# Patient Record
Sex: MALE | Race: WHITE | Employment: FULL TIME | URBAN - METROPOLITAN AREA
[De-identification: names, ages, dates, MRNs, and addresses within clinical notes are randomized per-mention and may not be internally consistent; named-entity substitution may affect disease eponyms.]

---

## 2019-10-23 ENCOUNTER — OFFICE VISIT (OUTPATIENT)
Dept: FAMILY MEDICINE | Facility: CLINIC | Age: 29
End: 2019-10-23
Payer: COMMERCIAL

## 2019-10-23 VITALS
WEIGHT: 213 LBS | BODY MASS INDEX: 29.82 KG/M2 | DIASTOLIC BLOOD PRESSURE: 78 MMHG | RESPIRATION RATE: 18 BRPM | TEMPERATURE: 97.9 F | OXYGEN SATURATION: 99 % | HEIGHT: 71 IN | SYSTOLIC BLOOD PRESSURE: 126 MMHG | HEART RATE: 91 BPM

## 2019-10-23 DIAGNOSIS — R30.9 PAINFUL URINATION: Primary | ICD-10-CM

## 2019-10-23 LAB
ALBUMIN UR-MCNC: NEGATIVE MG/DL
APPEARANCE UR: CLEAR
BILIRUB UR QL STRIP: NEGATIVE
COLOR UR AUTO: YELLOW
GLUCOSE UR STRIP-MCNC: NEGATIVE MG/DL
HGB UR QL STRIP: NEGATIVE
KETONES UR STRIP-MCNC: NEGATIVE MG/DL
LEUKOCYTE ESTERASE UR QL STRIP: NEGATIVE
NITRATE UR QL: NEGATIVE
PH UR STRIP: 7 PH (ref 5–7)
SOURCE: NORMAL
SP GR UR STRIP: 1.01 (ref 1–1.03)
UROBILINOGEN UR STRIP-ACNC: 0.2 EU/DL (ref 0.2–1)

## 2019-10-23 PROCEDURE — 99203 OFFICE O/P NEW LOW 30 MIN: CPT | Performed by: FAMILY MEDICINE

## 2019-10-23 PROCEDURE — 87591 N.GONORRHOEAE DNA AMP PROB: CPT | Performed by: FAMILY MEDICINE

## 2019-10-23 PROCEDURE — 87491 CHLMYD TRACH DNA AMP PROBE: CPT | Performed by: FAMILY MEDICINE

## 2019-10-23 PROCEDURE — 81003 URINALYSIS AUTO W/O SCOPE: CPT | Performed by: FAMILY MEDICINE

## 2019-10-23 ASSESSMENT — PAIN SCALES - GENERAL: PAINLEVEL: NO PAIN (0)

## 2019-10-23 ASSESSMENT — MIFFLIN-ST. JEOR: SCORE: 1954.32

## 2019-10-23 NOTE — NURSING NOTE
"Chief Complaint   Patient presents with     Urinary Pain     Painful urination with odor and dark in color. Intermittent over the last 6 months.      Flu Shot     declined       Initial /78   Pulse 91   Temp 97.9  F (36.6  C) (Tympanic)   Resp 18   Ht 1.797 m (5' 10.75\")   Wt 96.6 kg (213 lb)   SpO2 99%   BMI 29.92 kg/m   Estimated body mass index is 29.92 kg/m  as calculated from the following:    Height as of this encounter: 1.797 m (5' 10.75\").    Weight as of this encounter: 96.6 kg (213 lb).    Medication Reconciliation: complete    Rosalie Aldana CMA    "

## 2019-10-23 NOTE — PATIENT INSTRUCTIONS
Patient Education            Patient Education     Dysuria with Uncertain Cause (Adult)    The urethra is the tube that allows urine to pass out of the body. In a woman, the urethra is the opening above the vagina. In men, the urethra is the opening on the tip of the penis. Dysuria is the feeling of pain or burning in the urethra when passing urine.  Dysuria can be caused by anything that irritates or inflames the urethra. An infection or chemical irritation can cause this reaction. A bladder infection is the most common cause of dysuria in adults. A urine test can diagnose this. A bladder infection needs antibiotic treatment.  Soaps, lotions, colognes and feminine hygiene products can cause dysuria. So can birth control jellies, creams, and foams. It will go away 1 to 3 days after using these irritants.  Sexually transmitted diseases (STDs) such as chlamydia or gonorrhea can cause dysuria. Your healthcare provider may take a culture sample. Your provider may start you on antibiotic medicine before the culture test returns.  In women who have gone through menopause, dysuria can be from dryness in the lining of the urethra. This can be treated with hormones. Dysuria becomes long-term (chronic) when it lasts for weeks or months. You may need to see a specialist (urologist) to diagnose and treat chronic dysuria.  Home care  These home care tips may help:    Don't use any chemicals or products that you think may be causing your symptoms.    If you were given a prescription medicine, take as directed. Be sure to take it until it is all used up.    If a culture was taken, don't have sex until you have been told that it is negative. This means you don't have an infection. Then follow your healthcare provider's advice to treat your condition.  If a culture was done and it is positive:    Both you and your sexual partner may need to be treated. This is true even if your partner has no symptoms.    Contact your healthcare  provider or go to an urgent care clinic or the public health department to be looked at and treated.    Don't have sex until both you and your partner(s) have finished all antibiotics and your healthcare provider says you are no longer contagious.    Learn about and use safe sex practices. The safest sex is with a partner who has tested negative and only has sex with you. Condoms can prevent STDs from spreading, but they aren't a guarantee.  Follow-up care  Follow up with your healthcare provider, or as advised. If a culture was taken, you may call as directed for the results. If you have an STD, follow up with your provider or the public health department for a complete STD screening, including HIV testing. For more information, contact CDC-INFO at 964-403-3980.  When to seek medical advice  Call your healthcare provider right away if any of these occur:    You aren't better after 3 days of treatment    Fever of 100.4 F (38 C) or higher, or as directed by your healthcare provider    Back or belly pain that gets worse    You can't urinate because of pain    New discharge from the urethra, vagina, or penis    Painful sores on the penis    Rash or joint pain    Painful lumps (lymph nodes) in the groin    Testicle pain or swelling of the scrotum  Date Last Reviewed: 11/1/2016 2000-2018 [employer]. [address] [place] [address], [place] [address]. All rights reserved. This information is not intended as a substitute for professional medical care. Always follow your healthcare professional's instructions.

## 2019-10-23 NOTE — PROGRESS NOTES
Subjective     Danilo Car is a 29 year old male who presents to clinic today for the following health issues:    HPI     Chief Complaint   Patient presents with     Urinary Pain     Painful urination with odor and dark in color. Intermittent over the last 6 months.      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        Patient Active Problem List   Diagnosis     Attention deficit disorder     CARDIOVASCULAR SCREENING; LDL GOAL LESS THAN 160     Past Surgical History:   Procedure Laterality Date     APPENDECTOMY  9/08    laparoscopic       Social History     Tobacco Use     Smoking status: Never Smoker     Smokeless tobacco: Never Used   Substance Use Topics     Alcohol use: Yes     Family History   Problem Relation Age of Onset     Diabetes Sister          Current Outpatient Medications   Medication Sig Dispense Refill     NO ACTIVE MEDICATIONS        Allergies   Allergen Reactions     Keflex [Cephalexin] Rash     Reviewed and updated as needed this visit by Provider  Tobacco  Allergies  Meds  Problems  Med Hx  Surg Hx  Fam Hx         Review of Systems   C: NEGATIVE for  "fever, chills, change in weight  I: NEGATIVE for worrisome rashes, moles or lesions  R: NEGATIVE for significant cough or SOB  CV: NEGATIVE for chest pain, palpitations or peripheral edema  GI: NEGATIVE for nausea, abdominal pain, heartburn, or change in bowel habits  :see above  H: NEGATIVE for bleeding problems      Objective    /78   Pulse 91   Temp 97.9  F (36.6  C) (Tympanic)   Resp 18   Ht 1.797 m (5' 10.75\")   Wt 96.6 kg (213 lb)   SpO2 99%   BMI 29.92 kg/m    Body mass index is 29.92 kg/m .  Physical Exam   GENERAL:overweight, alert and no distress, ambulatory w/o assist  SKIN: no suspicious lesions, no rashes  BACK: no  CVA tenderness either side  ABD:  rounded, soft, no tenderness, no guarding, no mass palpable on exam    Diagnostic Test Results:  Results for orders placed or performed in visit on 10/23/19 (from the past 24 hour(s))   *UA reflex to Microscopic and Culture ([facility] [facility] (except [place] and [place])   Result Value Ref Range    Color Urine Yellow     Appearance Urine Clear     Glucose Urine Negative NEG^Negative mg/dL    Bilirubin Urine Negative NEG^Negative    Ketones Urine Negative NEG^Negative mg/dL    Specific Gravity Urine 1.015 1.003 - 1.035    Blood Urine Negative NEG^Negative    pH Urine 7.0 5.0 - 7.0 pH    Protein Albumin Urine Negative NEG^Negative mg/dL    Urobilinogen Urine 0.2 0.2 - 1.0 EU/dL    Nitrite Urine Negative NEG^Negative    Leukocyte Esterase Urine Negative NEG^Negative    Source Midstream Urine            Assessment & Plan     Danilo was seen today for urinary pain and flu shot.    Diagnoses and all orders for this visit:    Painful urination  -     *UA reflex to Microscopic and Culture ([facility] (except [place] and [place])  -     Neisseria gonorrhoeae PCR  -     Chlamydia trachomatis PCR      Patient was advised ua today is normal. No sign of infection or hematuria.  Still possible he passed a very small stone " during the one episode of sharp pain.  Advised to push fluids as tolerated.  Urine STI screen pending today.  Return precautions discussed and given to patient.           Patient Instructions     Patient Education            Patient Education     Dysuria with Uncertain Cause (Adult)    The urethra is the tube that allows urine to pass out of the body. In a woman, the urethra is the opening above the vagina. In men, the urethra is the opening on the tip of the penis. Dysuria is the feeling of pain or burning in the urethra when passing urine.  Dysuria can be caused by anything that irritates or inflames the urethra. An infection or chemical irritation can cause this reaction. A bladder infection is the most common cause of dysuria in adults. A urine test can diagnose this. A bladder infection needs antibiotic treatment.  Soaps, lotions, colognes and feminine hygiene products can cause dysuria. So can birth control jellies, creams, and foams. It will go away 1 to 3 days after using these irritants.  Sexually transmitted diseases (STDs) such as chlamydia or gonorrhea can cause dysuria. Your healthcare provider may take a culture sample. Your provider may start you on antibiotic medicine before the culture test returns.  In women who have gone through menopause, dysuria can be from dryness in the lining of the urethra. This can be treated with hormones. Dysuria becomes long-term (chronic) when it lasts for weeks or months. You may need to see a specialist (urologist) to diagnose and treat chronic dysuria.  Home care  These home care tips may help:    Don't use any chemicals or products that you think may be causing your symptoms.    If you were given a prescription medicine, take as directed. Be sure to take it until it is all used up.    If a culture was taken, don't have sex until you have been told that it is negative. This means you don't have an infection. Then follow your healthcare provider's advice to treat your  condition.  If a culture was done and it is positive:    Both you and your sexual partner may need to be treated. This is true even if your partner has no symptoms.    Contact your healthcare provider or go to an urgent care clinic or the public health department to be looked at and treated.    Don't have sex until both you and your partner(s) have finished all antibiotics and your healthcare provider says you are no longer contagious.    Learn about and use safe sex practices. The safest sex is with a partner who has tested negative and only has sex with you. Condoms can prevent STDs from spreading, but they aren't a guarantee.  Follow-up care  Follow up with your healthcare provider, or as advised. If a culture was taken, you may call as directed for the results. If you have an STD, follow up with your provider or the public health department for a complete STD screening, including HIV testing. For more information, contact CDC-INFO at 128-894-8778.  When to seek medical advice  Call your healthcare provider right away if any of these occur:    You aren't better after 3 days of treatment    Fever of 100.4 F (38 C) or higher, or as directed by your healthcare provider    Back or belly pain that gets worse    You can't urinate because of pain    New discharge from the urethra, vagina, or penis    Painful sores on the penis    Rash or joint pain    Painful lumps (lymph nodes) in the groin    Testicle pain or swelling of the scrotum  Date Last Reviewed: 11/1/2016 2000-2018 [employer]. [address] [place] [address]. All rights reserved. This information is not intended as a substitute for professional medical care. Always follow your healthcare professional's instructions.               Return in about 1 week (around 10/30/2019) for if worsening or with new symptoms.    Mohsen Willis MD  [facility]

## 2019-10-24 LAB
C TRACH DNA SPEC QL NAA+PROBE: NEGATIVE
N GONORRHOEA DNA SPEC QL NAA+PROBE: NEGATIVE
SPECIMEN SOURCE: NORMAL
SPECIMEN SOURCE: NORMAL

## 2020-10-03 ENCOUNTER — VIRTUAL VISIT (OUTPATIENT)
Dept: FAMILY MEDICINE | Facility: OTHER | Age: 30
End: 2020-10-03
Payer: COMMERCIAL

## 2020-10-03 ENCOUNTER — NURSE TRIAGE (OUTPATIENT)
Dept: NURSING | Facility: CLINIC | Age: 30
End: 2020-10-03

## 2020-10-03 PROCEDURE — 99421 OL DIG E/M SVC 5-10 MIN: CPT | Performed by: FAMILY MEDICINE

## 2020-10-04 ENCOUNTER — NURSE TRIAGE (OUTPATIENT)
Dept: NURSING | Facility: CLINIC | Age: 30
End: 2020-10-04

## 2020-10-04 ENCOUNTER — HOSPITAL ENCOUNTER (EMERGENCY)
Facility: CLINIC | Age: 30
Discharge: HOME | End: 2020-10-04
Attending: FAMILY MEDICINE | Admitting: FAMILY MEDICINE
Payer: COMMERCIAL

## 2020-10-04 ENCOUNTER — APPOINTMENT (OUTPATIENT)
Dept: GENERAL RADIOLOGY | Facility: CLINIC | Age: 30
End: 2020-10-04
Attending: FAMILY MEDICINE
Payer: COMMERCIAL

## 2020-10-04 ENCOUNTER — APPOINTMENT (OUTPATIENT)
Dept: CT IMAGING | Facility: CLINIC | Age: 30
End: 2020-10-04
Attending: FAMILY MEDICINE
Payer: COMMERCIAL

## 2020-10-04 VITALS
OXYGEN SATURATION: 98 % | SYSTOLIC BLOOD PRESSURE: 153 MMHG | HEART RATE: 109 BPM | RESPIRATION RATE: 33 BRPM | TEMPERATURE: 98.5 F | DIASTOLIC BLOOD PRESSURE: 90 MMHG

## 2020-10-04 DIAGNOSIS — B34.9 VIRAL SYNDROME: ICD-10-CM

## 2020-10-04 LAB
ALBUMIN SERPL-MCNC: 4.2 G/DL (ref 3.4–5)
ALBUMIN UR-MCNC: 100 MG/DL
ALP SERPL-CCNC: 92 U/L (ref 40–150)
ALT SERPL W P-5'-P-CCNC: 48 U/L (ref 0–70)
ANION GAP SERPL CALCULATED.3IONS-SCNC: 6 MMOL/L (ref 3–14)
APPEARANCE UR: CLEAR
AST SERPL W P-5'-P-CCNC: 32 U/L (ref 0–45)
BASOPHILS # BLD AUTO: 0 10E9/L (ref 0–0.2)
BASOPHILS NFR BLD AUTO: 0.2 %
BILIRUB SERPL-MCNC: 1 MG/DL (ref 0.2–1.3)
BILIRUB UR QL STRIP: NEGATIVE
BUN SERPL-MCNC: 12 MG/DL (ref 7–30)
C COLI+JEJUNI+LARI FUSA STL QL NAA+PROBE: NOT DETECTED
CALCIUM SERPL-MCNC: 9 MG/DL (ref 8.5–10.1)
CHLORIDE SERPL-SCNC: 103 MMOL/L (ref 94–109)
CO2 SERPL-SCNC: 26 MMOL/L (ref 20–32)
COLOR UR AUTO: ABNORMAL
CREAT SERPL-MCNC: 1.15 MG/DL (ref 0.66–1.25)
D DIMER PPP FEU-MCNC: 0.8 UG/ML FEU (ref 0–0.5)
DIFFERENTIAL METHOD BLD: ABNORMAL
EC STX1 GENE STL QL NAA+PROBE: NOT DETECTED
EC STX2 GENE STL QL NAA+PROBE: NOT DETECTED
ENTERIC PATHOGEN COMMENT: NORMAL
EOSINOPHIL # BLD AUTO: 0 10E9/L (ref 0–0.7)
EOSINOPHIL NFR BLD AUTO: 0 %
ERYTHROCYTE [DISTWIDTH] IN BLOOD BY AUTOMATED COUNT: 12.5 % (ref 10–15)
GFR SERPL CREATININE-BSD FRML MDRD: 85 ML/MIN/{1.73_M2}
GLUCOSE SERPL-MCNC: 117 MG/DL (ref 70–99)
GLUCOSE UR STRIP-MCNC: NEGATIVE MG/DL
HCT VFR BLD AUTO: 48.6 % (ref 40–53)
HGB BLD-MCNC: 16.4 G/DL (ref 13.3–17.7)
HGB UR QL STRIP: ABNORMAL
IMM GRANULOCYTES # BLD: 0 10E9/L (ref 0–0.4)
IMM GRANULOCYTES NFR BLD: 0.2 %
KETONES UR STRIP-MCNC: 5 MG/DL
LACTATE BLD-SCNC: 1.3 MMOL/L (ref 0.7–2)
LEUKOCYTE ESTERASE UR QL STRIP: NEGATIVE
LIPASE SERPL-CCNC: 83 U/L (ref 73–393)
LYMPHOCYTES # BLD AUTO: 0.3 10E9/L (ref 0.8–5.3)
LYMPHOCYTES NFR BLD AUTO: 5.5 %
MCH RBC QN AUTO: 28.8 PG (ref 26.5–33)
MCHC RBC AUTO-ENTMCNC: 33.7 G/DL (ref 31.5–36.5)
MCV RBC AUTO: 85 FL (ref 78–100)
MONOCYTES # BLD AUTO: 0.4 10E9/L (ref 0–1.3)
MONOCYTES NFR BLD AUTO: 5.9 %
MUCOUS THREADS #/AREA URNS LPF: PRESENT /LPF
NEUTROPHILS # BLD AUTO: 5.3 10E9/L (ref 1.6–8.3)
NEUTROPHILS NFR BLD AUTO: 88.2 %
NITRATE UR QL: NEGATIVE
NOROV GI+II ORF1-ORF2 JNC STL QL NAA+PR: NOT DETECTED
NRBC # BLD AUTO: 0 10*3/UL
NRBC BLD AUTO-RTO: 0 /100
NT-PROBNP SERPL-MCNC: 76 PG/ML (ref 0–450)
PH UR STRIP: 5 PH (ref 5–7)
PLATELET # BLD AUTO: 203 10E9/L (ref 150–450)
POTASSIUM SERPL-SCNC: 4.1 MMOL/L (ref 3.4–5.3)
PROT SERPL-MCNC: 8 G/DL (ref 6.8–8.8)
RBC # BLD AUTO: 5.69 10E12/L (ref 4.4–5.9)
RBC #/AREA URNS AUTO: 4 /HPF (ref 0–2)
RVA NSP5 STL QL NAA+PROBE: NOT DETECTED
SALMONELLA SP RPOD STL QL NAA+PROBE: NOT DETECTED
SHIGELLA SP+EIEC IPAH STL QL NAA+PROBE: NOT DETECTED
SODIUM SERPL-SCNC: 135 MMOL/L (ref 133–144)
SOURCE: ABNORMAL
SP GR UR STRIP: 1.03 (ref 1–1.03)
SQUAMOUS #/AREA URNS AUTO: <1 /HPF (ref 0–1)
TROPONIN I SERPL-MCNC: <0.015 UG/L (ref 0–0.04)
UROBILINOGEN UR STRIP-MCNC: 0 MG/DL (ref 0–2)
V CHOL+PARA RFBL+TRKH+TNAA STL QL NAA+PR: NOT DETECTED
WBC # BLD AUTO: 6 10E9/L (ref 4–11)
WBC #/AREA URNS AUTO: 1 /HPF (ref 0–5)
Y ENTERO RECN STL QL NAA+PROBE: NOT DETECTED

## 2020-10-04 PROCEDURE — 71045 X-RAY EXAM CHEST 1 VIEW: CPT

## 2020-10-04 PROCEDURE — 93005 ELECTROCARDIOGRAM TRACING: CPT | Performed by: FAMILY MEDICINE

## 2020-10-04 PROCEDURE — 87506 IADNA-DNA/RNA PROBE TQ 6-11: CPT | Performed by: FAMILY MEDICINE

## 2020-10-04 PROCEDURE — C9803 HOPD COVID-19 SPEC COLLECT: HCPCS | Performed by: FAMILY MEDICINE

## 2020-10-04 PROCEDURE — 93010 ELECTROCARDIOGRAM REPORT: CPT | Performed by: FAMILY MEDICINE

## 2020-10-04 PROCEDURE — 84484 ASSAY OF TROPONIN QUANT: CPT | Performed by: FAMILY MEDICINE

## 2020-10-04 PROCEDURE — 258N000003 HC RX IP 258 OP 636: Performed by: FAMILY MEDICINE

## 2020-10-04 PROCEDURE — 250N000011 HC RX IP 250 OP 636: Performed by: FAMILY MEDICINE

## 2020-10-04 PROCEDURE — 83605 ASSAY OF LACTIC ACID: CPT | Performed by: FAMILY MEDICINE

## 2020-10-04 PROCEDURE — 85379 FIBRIN DEGRADATION QUANT: CPT | Performed by: FAMILY MEDICINE

## 2020-10-04 PROCEDURE — 71275 CT ANGIOGRAPHY CHEST: CPT

## 2020-10-04 PROCEDURE — 83880 ASSAY OF NATRIURETIC PEPTIDE: CPT | Performed by: FAMILY MEDICINE

## 2020-10-04 PROCEDURE — 250N000009 HC RX 250: Performed by: FAMILY MEDICINE

## 2020-10-04 PROCEDURE — U0003 INFECTIOUS AGENT DETECTION BY NUCLEIC ACID (DNA OR RNA); SEVERE ACUTE RESPIRATORY SYNDROME CORONAVIRUS 2 (SARS-COV-2) (CORONAVIRUS DISEASE [COVID-19]), AMPLIFIED PROBE TECHNIQUE, MAKING USE OF HIGH THROUGHPUT TECHNOLOGIES AS DESCRIBED BY CMS-2020-01-R: HCPCS | Performed by: FAMILY MEDICINE

## 2020-10-04 PROCEDURE — 99285 EMERGENCY DEPT VISIT HI MDM: CPT | Mod: 25 | Performed by: FAMILY MEDICINE

## 2020-10-04 PROCEDURE — 85025 COMPLETE CBC W/AUTO DIFF WBC: CPT | Performed by: FAMILY MEDICINE

## 2020-10-04 PROCEDURE — 81001 URINALYSIS AUTO W/SCOPE: CPT | Performed by: FAMILY MEDICINE

## 2020-10-04 PROCEDURE — 83690 ASSAY OF LIPASE: CPT | Performed by: FAMILY MEDICINE

## 2020-10-04 PROCEDURE — 80053 COMPREHEN METABOLIC PANEL: CPT | Performed by: FAMILY MEDICINE

## 2020-10-04 RX ORDER — IOPAMIDOL 755 MG/ML
88 INJECTION, SOLUTION INTRAVASCULAR ONCE
Status: COMPLETED | OUTPATIENT
Start: 2020-10-04 | End: 2020-10-04

## 2020-10-04 RX ADMIN — SODIUM CHLORIDE, POTASSIUM CHLORIDE, SODIUM LACTATE AND CALCIUM CHLORIDE 1000 ML: 600; 310; 30; 20 INJECTION, SOLUTION INTRAVENOUS at 11:33

## 2020-10-04 RX ADMIN — IOPAMIDOL 88 ML: 755 INJECTION, SOLUTION INTRAVENOUS at 13:27

## 2020-10-04 RX ADMIN — SODIUM CHLORIDE, POTASSIUM CHLORIDE, SODIUM LACTATE AND CALCIUM CHLORIDE 1000 ML: 600; 310; 30; 20 INJECTION, SOLUTION INTRAVENOUS at 10:35

## 2020-10-04 RX ADMIN — SODIUM CHLORIDE 100 ML: 9 INJECTION, SOLUTION INTRAVENOUS at 13:27

## 2020-10-04 NOTE — TELEPHONE ENCOUNTER
Patient calling - says he has a fever, mild cough, sore back, diarrhea, loss of appetite and chills.  Last temperature was 100.5.  Symptoms started about noon today.  He is asking about getting tested for COVID19.    No difficulty breathing.  No chest pain.    Triaged to disposition of Call PCP When Office is Open.  Advised patient to go to [employer] and set up virtual visit with provider now.  Care advice and isolation recommendations given per protocol.  Advised to call back if fever over 103 occurs, fever lasts over 3 days, chest pain or difficulty breathing occurs or symptoms worsen.    Samina Bautista, ERNESTO  Triage Nurse Advisor    COVID 19 Nurse Triage Plan/Patient Instructions    Please be aware that novel coronavirus (COVID-19) may be circulating in the community. If you develop symptoms such as fever, cough, or SOB or if you have concerns about the presence of another infection including coronavirus (COVID-19), please contact your health care provider or visit [email or web address].     Disposition/Instructions    Virtual Visit with provider recommended. Reference Visit Selection Guide.    Thank you for taking steps to prevent the spread of this virus.  o Limit your contact with others.  o Wear a simple mask to cover your cough.  o Wash your hands well and often.    Resources    [facility]: About COVID-19: [email or web address]    CDC: What to Do If You're Sick: www.cdc.gov/coronavirus/2019-ncov/about/steps-when-sick.html    CDC: Ending Home Isolation: www.cdc.gov/coronavirus/2019-ncov/hcp/disposition-in-home-patients.html     CDC: Caring for Someone: www.cdc.gov/coronavirus/2019-ncov/if-you-are-sick/care-for-someone.html     [facility]: Interim Guidance for Hospital Discharge to Home: www.health.[facility].mn.us/diseases/coronavirus/hcp/hospdischarge.pdf    [facility] clinical trials (COVID-19 research studies): clinicalaffairs.[facility]/umn-clinical-trials     Below are the COVID-19 hotlines at the  [place] Department of Health ([facility]). Interpreters are available.   o For health questions: Call 245-817-5003 or 1-364.198.5365 (7 a.m. to 7 p.m.)  o For questions about schools and childcare: Call 659-513-4620 or 1-406.927.4149 (7 a.m. to 7 p.m.)     Reason for Disposition    [1] COVID-19 infection suspected by caller or triager AND [2] mild symptoms (cough, fever, or others) AND [3] no complications or SOB    Additional Information    Negative: SEVERE difficulty breathing (e.g., struggling for each breath, speaks in single words)    Negative: Difficult to awaken or acting confused (e.g., disoriented, slurred speech)    Negative: Bluish (or gray) lips or face now    Negative: Shock suspected (e.g., cold/pale/clammy skin, too weak to stand, low BP, rapid pulse)    Negative: Sounds like a life-threatening emergency to the triager    Negative: [1] COVID-19 exposure AND [2] no symptoms    Negative: COVID-19 and Breastfeeding, questions about    Negative: [1] Adult with possible COVID-19 symptoms AND [2] triager concerned about severity of symptoms or other causes    Negative: SEVERE or constant chest pain or pressure (Exception: mild central chest pain, present only when coughing)    Negative: MODERATE difficulty breathing (e.g., speaks in phrases, SOB even at rest, pulse 100-120)    Negative: Patient sounds very sick or weak to the triager    Negative: MILD difficulty breathing (e.g., minimal/no SOB at rest, SOB with walking, pulse <100)    Negative: Chest pain or pressure    Negative: Fever > 103 F (39.4 C)    Negative: [1] Fever > 101 F (38.3 C) AND [2] age > 60    Negative: [1] Fever > 100.0 F (37.8 C) AND [2] bedridden (e.g., nursing home patient, CVA, chronic illness, recovering from surgery)    Negative: HIGH RISK patient (e.g., age > 64 years, diabetes, heart or lung disease, weak immune system) (Exception: Has already been evaluated by healthcare provider and has no new or worsening symptoms)    Negative:  Fever present > 3 days (72 hours)    Negative: [1] Fever returns after gone for over 24 hours AND [2] symptoms worse or not improved    Negative: [1] Continuous (nonstop) coughing interferes with work or school AND [2] no improvement using cough treatment per protocol    Protocols used: CORONAVIRUS (COVID-19) DIAGNOSED OR [id number] 8.4.20

## 2020-10-04 NOTE — DISCHARGE INSTRUCTIONS
"We will notify you if you cover test is positive.  You can check the results through my chart.  This might just be a summer stomach virus.  We will notify you if your stool tests are positive.  You should be seen for reevaluation if you have fevers greater than 100.4 greater than 3 days, difficulty breathing, increased cough, or worsening chest pain.  Discharge Instructions for COVID-19 Patients  You may have COVID-19. Please follow the instructions listed below.   If you have a weakened immune system, discuss with your doctor any other actions you need to take.  How can I protect others?  If you have symptoms (fever, cough, body aches or trouble breathing):  Stay home and away from others (self-isolate) until:  At least 10 days have passed since your symptoms started, And   You've had no fever--and no medicine that reduces fever--for 1 full day (24 hours), And    Your other symptoms have resolved (gotten better).  If you don't show symptoms, but testing showed that you have COVID-19:  Stay home and away from others (self-isolate). Follow the tips under \"How do I self-isolate?\" below for 10 days (20 days if you have a weak immune system).  You don't need to be retested for COVID-19 before going back to school or work. As long as you're fever-free and feeling better, you can go back to school, work and other activities after waiting the 10 or 20 days.   How do I self-isolate?  Stay in your own room, even for meals. Use your own bathroom if you can.  Stay away from others in your home. No hugging, kissing or shaking hands. No visitors.  Don't go to work, school or anywhere else.  Clean \"high touch\" surfaces often (doorknobs, counters, handles). Use household cleaning spray or wipes. You'll find a full list of  on the EPA website: www.epa.gov/pesticide-registration/list-n-disinfectants-use-against-sars-cov-2.  Cover your mouth and nose with a mask or other face covering to avoid spreading germs.  Wash your " hands and face often. Use soap and water.  Caregivers in these groups are at risk for severe illness due to COVID-19:  People 65 years and older  People who live in a nursing home or long-term care facility  People with chronic disease (lung, heart, cancer, diabetes, kidney, liver, immunologic)  People who have a weakened immune system, including those who:  Are in cancer treatment  Take medicine that weakens the immune system, such as corticosteroids  Had a bone marrow or organ transplant  Have an immune deficiency  Have poorly controlled HIV or AIDS  Are obese (body mass index of 40 or higher)  Smoke regularly  Caregivers should wear gloves while washing dishes, handling laundry and cleaning bedrooms and bathrooms.  Use caution when washing and drying laundry: Don't shake dirty laundry and use the warmest water setting that you can.  For more tips on managing your health at home, go to www.cdc.gov/coronavirus/2019-ncov/downloads/10Things.pdf.  How can I take care of myself at home?  Get lots of rest. Drink extra fluids (unless a doctor has told you not to).    Take Tylenol (acetaminophen) for fever or pain. If you have liver or kidney problems, ask your family doctor if it's okay to take Tylenol.     Adults can take either:  650 mg (two 325 mg pills) every 4 to 6 hours, or   1,000 mg (two 500 mg pills) every 8 hours as needed.  Note: Don't take more than 3,000 mg in one day. Acetaminophen is found in many medicines (both prescribed and over-the-counter medicines). Read all labels to be sure you don't take too much.   For children, check the Tylenol bottle for the right dose. The dose is based on the child's age or weight.  If you have other health problems (like cancer, heart failure, an organ transplant or severe kidney disease): Call your specialty clinic if you don't feel better in the next 2 days.    Know when to call 911. Emergency warning signs include:  Trouble breathing or shortness of breath  Pain or  pressure in the chest that doesn't go away  Feeling confused like you haven't felt before, or not being able to wake up  Bluish-colored lips or face    Your doctor may have prescribed a blood thinner medicine. Follow their instructions.  Where can I get more information?  [facility] - About COVID-19: [email or web address]  CDC - What to Do If You're Sick: www.cdc.gov/coronavirus/2019-ncov/about/steps-when-sick.html  CDC - Ending Home Isolation: www.cdc.gov/coronavirus/2019-ncov/hcp/disposition-in-home-patients.html  CDC - Caring for Someone: www.cdc.gov/coronavirus/2019-ncov/if-you-are-sick/care-for-someone.html  [facility] - Interim Guidance for Hospital Discharge to Home: www.health.[facility].mn.us/diseases/coronavirus/hcp/hospdischarge.pdf  [facility] clinical trials (COVID-19 research studies): clinicalaffairs.[facility]-clinical-trials  Below are the COVID-19 hotlines at the [facility] of Health ([facility]). Interpreters are available.  For health questions: Call 540-435-0302 or 1-822.391.1971 (7 a.m. to 7 p.m.)  For questions about schools and childcare: Call 777-584-1402 or 1-838.540.3246 (7 a.m. to 7 p.m.)    For informational purposes only. Not to replace the advice of your health care provider. Clinically reviewed by the Infection Prevention Team. Copyright   2020 [facility]. All rights reserved. [employer] [id number] - REV 08/04/20.

## 2020-10-04 NOTE — PROGRESS NOTES
"Date: 10/03/2020 19:40:44  Clinician: Patric Velez  Clinician NPI: [id number]  Patient: Danilo Car  Patient : 1990  Patient Address: [address] [place] [address]  Patient Phone: [phone]  Visit Protocol: URI  Patient Summary:  Danilo is a 30 year old ( : 1990 ) male who initiated a [facility] Visit for COVID-19 (Coronavirus) evaluation and screening. When asked the question \"Please sign me up to receive news, health information and promotions from [facility].\", Danilo responded \"No\".    Danilo states his symptoms started today.   His symptoms consist of a headache, a cough, rhinitis, myalgia, chills, malaise, and diarrhea. He is experiencing difficulty breathing due to nasal congestion but he is not short of breath. Danilo also feels feverish.   Symptom details     Nasal secretions: The color of his mucus is white.    Cough: Danilo coughs a few times an hour and his cough is not more bothersome at night. Phlegm does not come into his throat when he coughs. He does not believe his cough is caused by post-nasal drip.     Temperature: His current temperature is 99.9 degrees Fahrenheit.     Headache: He states the headache is mild (1-3 on a 10 point pain scale).      Danilo denies having ear pain, wheezing, nasal congestion, anosmia, vomiting, nausea, facial pain or pressure, sore throat, teeth pain, and ageusia. He also denies having a sinus infection within the past year, taking antibiotic medication in the past month, and having recent facial or sinus surgery in the past 60 days.   Precipitating events  He has not recently been exposed to someone with influenza. Danilo has not been in close contact with any high risk individuals.   Pertinent COVID-19 (Coronavirus) information  In the past 14 days, Danilo has not worked in a congregate living setting.   He either works or volunteers as a healthcare worker or a , or works or volunteers in a healthcare facility. He provides direct patient care. " Additional job details as reported by the patient (free text):  [employer]. 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   Danilo also has not lived in a congregate living setting in the past 14 days. He does not live with a healthcare worker.   Danilo has not had a close contact with a laboratory-confirmed COVID-19 patient within 14 days of symptom onset.   Since December 2019, Danilo and has not had upper respiratory infection or influenza-like illness. Has not been diagnosed with lab-confirmed COVID-19 test   Pertinent medical history  Danilo needs a return to work/school note.   Weight: 210 lbs   Danilo does not smoke or use smokeless tobacco.   Weight: 210 lbs    MEDICATIONS: No current medications, ALLERGIES: cephalexin  Clinician Response:  Dear Danilo,   Your symptoms show that you may have coronavirus (COVID-19). This illness can cause fever, cough and trouble breathing. Many people get a mild case and get better on their own. Some people can get very sick.  What should I do?  We would like to test you for this virus.   1. Please call [phone] to schedule your visit. Explain that you were referred by [facility] to have a COVID-19 test. Be ready to share your [facility] visit ID number.  The following will serve as your written order for this COVID Test, ordered by me, for the indication of suspected COVID [Z20.828]: The test will be ordered in [facility], our electronic health record, after you are scheduled. It will show as ordered and authorized by Yayo Castillo MD.  Order: COVID-19 (Coronavirus) PCR for SYMPTOMATIC testing from [facility].      2. When it's time for your COVID test:  Stay at least 6 feet away from others. (If someone will drive you to your test, stay in the backseat, as far away from the  as you can.)   Cover your mouth and nose with a mask, tissue or washcloth.  Go straight to the testing site. Don't make any stops on the way there or back.      3.Starting  "now: Stay home and away from others (self-isolate) until:   You've had no fever---and no medicine that reduces fever---for one full day (24 hours). And...   Your other symptoms have gotten better. For example, your cough or breathing has improved. And...   At least 10 days have passed since your symptoms started.       During this time, don't leave the house except for testing or medical care.   Stay in your own room, even for meals. Use your own bathroom if you can.   Stay away from others in your home. No hugging, kissing or shaking hands. No visitors.  Don't go to work, school or anywhere else.    Clean \"high touch\" surfaces often (doorknobs, counters, handles, etc.). Use a household cleaning spray or wipes. You'll find a full list of  on the EPA website: www.epa.gov/pesticide-registration/list-n-disinfectants-use-against-sars-cov-2.   Cover your mouth and nose with a mask, tissue or washcloth to avoid spreading germs.  Wash your hands and face often. Use soap and water.  Caregivers in these groups are at risk for severe illness due to COVID-19:  o People 65 years and older  o People who live in a nursing home or long-term care facility  o People with chronic disease (lung, heart, cancer, diabetes, kidney, liver, immunologic)  o People who have a weakened immune system, including those who:   Are in cancer treatment  Take medicine that weakens the immune system, such as corticosteroids  Had a bone marrow or organ transplant  Have an immune deficiency  Have poorly controlled HIV or AIDS  Are obese (body mass index of 40 or higher)  Smoke regularly   o Caregivers should wear gloves while washing dishes, handling laundry and cleaning bedrooms and bathrooms.  o Use caution when washing and drying laundry: Don't shake dirty laundry, and use the warmest water setting that you can.  o For more tips, go to www.cdc.gov/coronavirus/2019-ncov/downloads/10Things.pdf.    4.Sign up for Norma Ortiz. We know it's scary " to hear that you might have COVID-19. We want to track your symptoms to make sure you're okay over the next 2 weeks. Please look for an email from [employer]---this is a free, online program that we'll use to keep in touch. To sign up, follow the link in the email. Learn more at [email or web address]  How can I take care of myself?   Get lots of rest. Drink extra fluids (unless a doctor has told you not to).   Take Tylenol (acetaminophen) for fever or pain. If you have liver or kidney problems, ask your family doctor if it's okay to take Tylenol.   Adults can take either:    650 mg (two 325 mg pills) every 4 to 6 hours, or...   1,000 mg (two 500 mg pills) every 8 hours as needed.    Note: Don't take more than 3,000 mg in one day. Acetaminophen is found in many medicines (both prescribed and over-the-counter medicines). Read all labels to be sure you don't take too much.   For children, check the Tylenol bottle for the right dose. The dose is based on the child's age or weight.    If you have other health problems (like cancer, heart failure, an organ transplant or severe kidney disease): Call your specialty clinic if you don't feel better in the next 2 days.       Know when to call 911. Emergency warning signs include:    Trouble breathing or shortness of breath Pain or pressure in the chest that doesn't go away Feeling confused like you haven't felt before, or not being able to wake up Bluish-colored lips or face.  Where can I get more information?    [facility] -- About COVID-19: [email or web address]   CDC -- What to Do If You're Sick: www.cdc.gov/coronavirus/2019-ncov/about/steps-when-sick.html   CDC -- Ending Home Isolation: www.cdc.gov/coronavirus/2019-ncov/hcp/disposition-in-home-patients.html   CDC -- Caring for Someone: www.cdc.gov/coronavirus/2019-ncov/if-you-are-sick/care-for-someone.html   [facility] -- Interim Guidance for Hospital Discharge to Home:  www.health.[facility].[place]/diseases/coronavirus/hcp/hospdischarge.pdf   [facility] clinical trials (COVID-19 research studies): clinicalaffairs.[facility]/umn-clinical-trials    Below are the COVID-19 hotlines at the [facility] of [facility] ([facility]). Interpreters are available.    For health questions: Call 409-506-8389 or 1-855.318.5896 (7 a.m. to 7 p.m.) For questions about schools and childcare: Call 536-500-1971 or 1-653.586.5573 (7 a.m. to 7 p.m.)    Diagnosis: Cough  Diagnosis ICD: R05

## 2020-10-04 NOTE — TELEPHONE ENCOUNTER
Pt states he was advised to seek care in the ED for chest pain/COVID symptoms.  Pt feels like there is a stack of books on his chest.  Pt questions ED protocol.     Information given.  He verbalized understanding and had no further questions.     COVID 19 Nurse Triage Plan/Patient Instructions    Please be aware that novel coronavirus (COVID-19) may be circulating in the community. If you develop symptoms such as fever, cough, or SOB or if you have concerns about the presence of another infection including coronavirus (COVID-19), please contact your health care provider or visit [email or web address].     Disposition/Instructions    ED Visit recommended. Follow protocol based instructions.     Bring Your Own Device:  Please also bring your smart device(s) (smart phones, tablets, laptops) and their charging cables for your personal use and to communicate with your care team during your visit.    Thank you for taking steps to prevent the spread of this virus.  o Limit your contact with others.  o Wear a simple mask to cover your cough.  o Wash your hands well and often.    Resources    [facility]: About COVID-19: [email or web address]    CDC: What to Do If You're Sick: www.cdc.gov/coronavirus/2019-ncov/about/steps-when-sick.html    CDC: Ending Home Isolation: www.cdc.gov/coronavirus/2019-ncov/hcp/disposition-in-home-patients.html     CDC: Caring for Someone: www.cdc.gov/coronavirus/2019-ncov/if-you-are-sick/care-for-someone.html     [facility]: Interim Guidance for Hospital Discharge to Home: www.health.[facility].[place]/diseases/coronavirus/hcp/hospdischarge.pdf    [facility] clinical trials (COVID-19 research studies): clinicalaffairs.[facility]/um-clinical-trials     Below are the COVID-19 hotlines at the [place] [facility] of [facility] ([facility]). Interpreters are available.   o For health questions: Call 389-830-8829 or 1-391.901.6221 (7 a.m. to 7 p.m.)  o For questions about schools and childcare: Call  299.722.2800 or 1-381.480.3065 (7 a.m. to 7 p.m.)       Darlene Soto RN/NAFISA      Additional Information    Negative: RN needs further essential information from caller in order to complete triage    Negative: Requesting regular office appointment    Negative: [1] Caller requesting NON-URGENT health information AND [2] PCP's office is the best resource    Health Information question, no triage required and triager able to answer question    Protocols used: INFORMATION ONLY CALL-A-AH

## 2020-10-04 NOTE — ED PROVIDER NOTES
History     Chief Complaint   Patient presents with     Chest Pain     mid sternum chest pain and SOB since this am. headache, fevers, dry cough, vomit x1 and diarrhea since yesterday     HPI    Danilo Car is a 30 year old male who presents with 1 day of diarrhea, fever, nausea and vomiting.  Symptoms began yesterday.  About 6 PM he started having loose watery stools and had them about every 2 hours through the night.  He has not had melena or hematochezia.  He also has felt dyspneic with exertion but not at rest.  He is felt heaviness in the chest like a weight on his chest.  Heaviness he feels in the epigastric region.  He has not had ankle edema or calf pain.  He does not smoke or vape.  He has not recently used alcohol.  He has no significant identified chronic medical problems and takes no prescription medication.  He works as a  but has not had any known covert exposures.  He has not had any suspicious food exposures or recent antibiotic use.  He has had a prior appendectomy a few years ago without complication.  He has not had other abdominal surgery.  He has not urinated in about a day but has not had any irritative voiding symptoms.  He has a mild dry cough.  He has a mild headache.  He has felt like he has had a fever.  He vomited once yesterday but currently does not have any significant nausea and has not vomited since.    Allergies:  Allergies   Allergen Reactions     Keflex [Cephalexin] Rash       Problem List:    Patient Active Problem List    Diagnosis Date Noted     CARDIOVASCULAR SCREENING; LDL GOAL LESS THAN 160 08/30/2012     Priority: Medium     Attention deficit disorder 06/09/2008     Priority: Medium     Problem list name updated by automated process. Provider to review          Past Medical History:    Past Medical History:   Diagnosis Date     Attention deficit disorder without mention of hyperactivity        Past Surgical History:    Past Surgical History:   Procedure  Laterality Date     APPENDECTOMY  9/08    laparoscopic       Family History:    Family History   Problem Relation Age of Onset     Diabetes Sister        Social History:  Marital Status:   [2]  Social History     Tobacco Use     Smoking status: Never Smoker     Smokeless tobacco: Never Used   Substance Use Topics     Alcohol use: Yes     Drug use: No        Medications:         NO ACTIVE MEDICATIONS          Review of Systems  All other systems are reviewed and are negative    Physical Exam   BP: (!) 150/102  Pulse: 113  Temp: 98.5  F (36.9  C)  Resp: 23  SpO2: 98 %      Physical Exam    Nursing note and vitals were reviewed.  Constitutional: Awake and alert, adequately nourished and developed appearing 30-year-old in no apparent discomfort, who appears moderately ill, and who answers questions appropriately and cooperates with examination.  HEENT: EOMI.   Neck: Freely mobile.  Cardiovascular: Cardiac examination reveals tachycardic heart rate and regular rhythm without murmur.  Pulmonary/Chest: Breathing is unlabored.  Breath sounds are clear and equal bilaterally.  There no retractions, tachypnea, rales, wheezes, or rhonchi.  Abdomen: Soft, nontender, no HSM or masses rebound or guarding.  Musculoskeletal: Extremities are warm and well-perfused and without edema  Neurological: Alert, oriented, thought content logical, coherent   Skin: Warm, dry, no rashes.  Psychiatric: Affect broad and appropriate.      ED Course        Procedures               EKG Interpretation:      Interpreted by Elijah Barahona MD  Time reviewed: 10:12  Symptoms at time of EKG: Chest pressure  Rhythm: sinus   Rate: 116  Axis: normal  Ectopy: none  Conduction: normal  ST Segments/ T Waves: No ST-T wave changes  Q Waves: none  Comparison to prior: No old EKG available    Clinical Impression: Sinus tachycardia, otherwise normal EKG      Critical Care time:  none               Results for orders placed or performed during the hospital  encounter of 10/04/20 (from the past 24 hour(s))   Comprehensive metabolic panel   Result Value Ref Range    Sodium 135 133 - 144 mmol/L    Potassium 4.1 3.4 - 5.3 mmol/L    Chloride 103 94 - 109 mmol/L    Carbon Dioxide 26 20 - 32 mmol/L    Anion Gap 6 3 - 14 mmol/L    Glucose 117 (H) 70 - 99 mg/dL    Urea Nitrogen 12 7 - 30 mg/dL    Creatinine 1.15 0.66 - 1.25 mg/dL    GFR Estimate 85 >60 mL/min/[1.73_m2]    GFR Estimate If Black >90 >60 mL/min/[1.73_m2]    Calcium 9.0 8.5 - 10.1 mg/dL    Bilirubin Total 1.0 0.2 - 1.3 mg/dL    Albumin 4.2 3.4 - 5.0 g/dL    Protein Total 8.0 6.8 - 8.8 g/dL    Alkaline Phosphatase 92 40 - 150 U/L    ALT 48 0 - 70 U/L    AST 32 0 - 45 U/L   CBC with platelets differential   Result Value Ref Range    WBC 6.0 4.0 - 11.0 10e9/L    RBC Count 5.69 4.4 - 5.9 10e12/L    Hemoglobin 16.4 13.3 - 17.7 g/dL    Hematocrit 48.6 40.0 - 53.0 %    MCV 85 78 - 100 fl    MCH 28.8 26.5 - 33.0 pg    MCHC 33.7 31.5 - 36.5 g/dL    RDW 12.5 10.0 - 15.0 %    Platelet Count 203 150 - 450 10e9/L    Diff Method Automated Method     % Neutrophils 88.2 %    % Lymphocytes 5.5 %    % Monocytes 5.9 %    % Eosinophils 0.0 %    % Basophils 0.2 %    % Immature Granulocytes 0.2 %    Nucleated RBCs 0 0 /100    Absolute Neutrophil 5.3 1.6 - 8.3 10e9/L    Absolute Lymphocytes 0.3 (L) 0.8 - 5.3 10e9/L    Absolute Monocytes 0.4 0.0 - 1.3 10e9/L    Absolute Eosinophils 0.0 0.0 - 0.7 10e9/L    Absolute Basophils 0.0 0.0 - 0.2 10e9/L    Abs Immature Granulocytes 0.0 0 - 0.4 10e9/L    Absolute Nucleated RBC 0.0    Lactate for Sepsis Protocol   Result Value Ref Range    Lactate for Sepsis Protocol 1.3 0.7 - 2.0 mmol/L   D dimer quantitative   Result Value Ref Range    D Dimer 0.8 (H) 0.0 - 0.50 ug/ml FEU   Lipase   Result Value Ref Range    Lipase 83 73 - 393 U/L   Troponin I   Result Value Ref Range    Troponin I ES <0.015 0.000 - 0.045 ug/L   Nt probnp inpatient (BNP)   Result Value Ref Range    N-Terminal Pro BNP Inpatient 76  0 - 450 pg/mL   XR Chest Port 1 View    Narrative    CHEST PORTABLE ONE VIEW   10/4/2020 11:07 AM     HISTORY: Dyspnea.    COMPARISON: None.      Impression    IMPRESSION: No acute cardiopulmonary disease.    BELINDA CALLEJAS MD   UA reflex to Microscopic   Result Value Ref Range    Color Urine Naila     Appearance Urine Clear     Glucose Urine Negative NEG^Negative mg/dL    Bilirubin Urine Negative NEG^Negative    Ketones Urine 5 (A) NEG^Negative mg/dL    Specific Gravity Urine 1.032 1.003 - 1.035    Blood Urine Small (A) NEG^Negative    pH Urine 5.0 5.0 - 7.0 pH    Protein Albumin Urine 100 (A) NEG^Negative mg/dL    Urobilinogen mg/dL 0.0 0.0 - 2.0 mg/dL    Nitrite Urine Negative NEG^Negative    Leukocyte Esterase Urine Negative NEG^Negative    Source Midstream Urine     RBC Urine 4 (H) 0 - 2 /HPF    WBC Urine 1 0 - 5 /HPF    Squamous Epithelial /HPF Urine <1 0 - 1 /HPF    Mucous Urine Present (A) NEG^Negative /LPF   CT Chest Pulmonary Embolism w Contrast    Narrative    CT CHEST PULMONARY EMBOLISM WITH CONTRAST 10/4/2020 1:55 PM    CLINICAL HISTORY: Chest pain; dyspnea.    TECHNIQUE: CT angiogram chest during arterial phase injection IV  contrast. 2D and 3D MIP reconstructions were performed by the CT  technologist. Dose reduction techniques were used.     CONTRAST: 88mL Isovue-370    COMPARISON: None.    FINDINGS:  ANGIOGRAM CHEST: Pulmonary arteries are normal caliber and negative  for pulmonary emboli. Thoracic aorta is negative for dissection.    LUNGS AND PLEURA: No effusions. Mild bibasilar atelectasis. Calcified  granuloma at the left lower lobe, series 6 image 126.    MEDIASTINUM/AXILLAE: No enlarged lymph node.    UPPER ABDOMEN: No acute abnormality.    MUSCULOSKELETAL: Normal.      Impression    IMPRESSION:  1.  No evidence for pulmonary embolism or other acute abnormality.  2.  Mild bibasilar atelectasis.       Medications   lactated ringers BOLUS 1,000 mL (0 mLs Intravenous Stopped 10/4/20 1132)      Followed by   lactated ringers BOLUS 1,000 mL (1,000 mLs Intravenous New Bag 10/4/20 1133)   iopamidol (ISOVUE-370) solution 88 mL (88 mLs Intravenous Given 10/4/20 1327)   sodium chloride 0.9 % bag 500mL for CT scan flush use (100 mLs Intravenous Given 10/4/20 1327)     12:11: Patient reassessed.  Continues to have epigastric chest pain but the gurgling in his abdomen has diminished.  Continues to have mild tachycardia after 1 L of Ringer's lactate.  A second liter is infusing.  Blood pressures are mildly elevated but not concerning.  We reviewed his test results.  These are all reassuring with the exception of elevated d-dimer and lymphopenia.  This is a bit concerning for COVID.  His chest x-ray is normal.  Urine result is pending.  He has produced a stool sample for analysis.  Given his persistent pain and tachycardia I recommended we proceed with chest CT with PE protocol.  The d-dimer being 0.8 alone would not be sufficient to proceed with this but given the presence of tachycardia and pain, I think we should proceed to assess for PE and other causes for sinus tachycardia and chest pain.  My suspicion is that the chest pain is GI in origin coming from his GI symptoms.    Assessments & Plan (with Medical Decision Making)     30-year-old male presented with diarrhea, fever, cough, chest discomfort.  This is really more epigastric pain and I suspect is more GI in origin.  The symptoms are colicky in nature making them more likely to be GI.  He has a normal EKG, troponin, BNP.  He has a normal chest x-ray.  Symptoms could be compatible with COVID-19.  He has somewhat more diarrhea than I would expect with this and they could be due to enteroviral illness.  Stool multiplex testing is pending.  COVID-19 testing is pending.  Patient had lymphopenia and elevated d-dimer somewhat increasing the likelihood of COVID-19.  Given his sinus tachycardia and chest pain and elevated d-dimer, he underwent chest CT using PE  protocol and this was normal.  At this time no further work-up is indicated.  He has some type of likely viral syndrome.  Symptomatic care was outlined.  He should assume he has COVID until he has a negative test result.  He should be seen if his symptoms persist or worsen or new concerning symptoms develop.  He expressed understanding and agreement with the plan and his questions were all answered.    I have reviewed the nursing notes.    I have reviewed the findings, diagnosis, plan and need for follow up with the patient.       New Prescriptions    No medications on file       Final diagnoses:   Viral syndrome       10/4/2020   [facility] EMERGENCY DEPT     Elijah Barahona MD  10/04/20 [id number]

## 2020-10-04 NOTE — ED AVS SNAPSHOT
[facility] Emergency Dept  [address] [address]  Phone: [phone]  Fax: [phone]                                    Danilo Car   MRN: [id number]    Department: [facility] Emergency Dept   Date of Visit: 10/4/2020           After Visit Summary Signature Page    I have received my discharge instructions, and my questions have been answered. I have discussed any challenges I see with this plan with the nurse or doctor.    ..........................................................................................................................................  Patient/Patient Representative Signature      ..........................................................................................................................................  Patient Representative Print Name and Relationship to Patient    ..................................................               ................................................  Date                                   Time    ..........................................................................................................................................  Reviewed by Signature/Title    ...................................................              ..............................................  Date                                               Time          22EPIC Rev 08/18

## 2020-10-04 NOTE — ED TRIAGE NOTES
Chest pain and SOB started today. Fevers, headache, dry cough, emesis x1 and diarrhea started yesterday. Pt is a  in [place].

## 2020-10-05 ENCOUNTER — PATIENT OUTREACH (OUTPATIENT)
Dept: CARE COORDINATION | Facility: CLINIC | Age: 30
End: 2020-10-05

## 2020-10-05 LAB
SARS-COV-2 RNA SPEC QL NAA+PROBE: NOT DETECTED
SPECIMEN SOURCE: NORMAL

## 2020-10-05 NOTE — LETTER
[facility]  [address] [place] [address]    October 6, 2020    Danilo Car  [address] [place] [address]      Dear Danilo,    I am a clinic care coordinator who works with Mohsen Willis MD at [facility]. I am reaching out to you to introduce Clinic Care Coordination, which is an additional and voluntary source of support for our patients.     The clinic care coordinator team is made up of a registered nurse,  and community health worker who understand the health care system. The goal of clinic care coordination is to help you manage your health and improve access to the health care system in the most efficient manner. The team can assist you in meeting your health care goals by providing education, strengthening the communication among your providers and supporting you with any resource needs.     Please feel free to contact Sushma, our community health worker at [phone] if you would like to schedule an initial assessment with a care coordinator. We recognize the ever-changing and stressful situation we are all facing with the current COVID-19 pandemic. We are focused on providing you with the highest-quality healthcare experience possible and ensuring you have the resources and information needed to remain safe and as healthy as possible.     Sincerely,     ERNESTO Gagnon Care Coordinator  Part of the Care Coordination team at [facility]    Included below is some helpful patient information and resources related to COVID-19 that we share with all our patients who have had a recent COVID-19 test.  Instructions for Patients  After Your COVID-19 (Coronavirus) Test  You have been tested for COVID-19 (coronavirus).   If you'll have surgery in the next few days, we'll let you know ahead of time if you have the virus. Please call your surgeon's office with any questions.  For all other patients: Results are usually  "available within 7 to 10 days. Our testing sites do not have access to your test results.     If your test result is positive, you'll get a phone call letting you know. (A positive test means that you have the virus.)    Follow the tips under \"How do I self-isolate?\" below for 10 days (20 days if you have a weak immune system)    You don't need to be retested for COVID-19 before going back to school or work. As long as you're fever-free and feeling better, you can go back to school, work and other activities after waiting the 10 or 20 days.     If your test result is negative, you'll get a letter in the mail. (A negative test suggests you do not have the virus.)    You may also receive your results in [employer]. If you have questions after getting your results, please visit our testing website at [email or web address].  After 7 to 10 days, if you have not gotten your results:     Call [phone] ([phone]) and ask to speak with our COVID-19 results team.    If you're being treated at an infusion center: Call your infusion center directly.  What are the symptoms of COVID-19?  Symptoms may include any of the following: Fever, cough, trouble breathing, headache, body aches, sore throat, runny or stuffy nose, fatigue (feeling very tired), diarrhea (loose poop), and nausea or vomiting (feeling sick to the stomach or throwing up).  You may already have symptoms of COVID-19, or they may show up later.  What should I do if I have symptoms?  If you're having surgery: Call your surgeon's office.  For all other patients: Stay home and away from others (self-isolate) until ...    You've had no fever--and no medicine that reduces fever--for 1 full day (24 hours), And     Other symptoms have gotten better. For example, your cough or breathing has improved, And     At least 10 days have passed since your symptoms first started.  How do I self-isolate?    Stay in your own room, even for meals. Use your " "own bathroom if you can.    Stay away from others in your home. No hugging, kissing or shaking hands. No visitors.    Don't go to work, school or anywhere else.    Clean \"high touch\" surfaces often (doorknobs, counters, handles). Use household cleaning spray or wipes. You'll find a full list of  on the EPA website: www.epa.gov/pesticide-registration/list-n-disinfectants-use-against-sars-cov-2.    Cover your mouth and nose with a mask or other face covering to avoid spreading germs.    Wash your hands and face often. Use soap and water.    Caregivers in these groups are at risk for severe illness due to COVID-19:  ? People 65 years and older  ? People who live in a nursing home or long-term care facility  ? People with chronic disease (lung, heart, cancer, diabetes, kidney, liver, immunologic)  ? People who have a weakened immune system, including those who:    Are in cancer treatment    Take medicine that weakens the immune system, such as corticosteroids    Had a bone marrow or organ transplant    Have an immune deficiency    Have poorly controlled HIV or AIDS    Are obese (body mass index of 40 or higher)    Smoke regularly    Caregivers should wear gloves while washing dishes, handling laundry and cleaning bedrooms and bathrooms.    Use caution when washing and drying laundry: Don't shake dirty laundry and use the warmest water setting that you can.    For more tips on managing your health at home, go to www.cdc.gov/coronavirus/2019-ncov/downloads/10Things.pdf.  How can I take care of myself at home?  1. Get lots of rest. Drink extra fluids (unless a doctor has told you not to).    2. Take Tylenol (acetaminophen) for fever or pain. If you have liver or kidney problems, ask your family doctor if it's okay to take Tylenol.     Adults can take either:  ? 650 mg (two 325 mg pills) every 4 to 6 hours, or   ? 1,000 mg (two 500 mg pills) every 8 hours as needed.  ? Note: Don't take more than 3,000 mg in one day. " Acetaminophen is found in many medicines (both prescribed and over-the-counter medicines). Read all labels to be sure you don't take too much.  For children, check the Tylenol bottle for the right dose. The dose is based on the child's age or weight.  3. If you have other health problems (like cancer, heart failure, an organ transplant or severe kidney disease): Call your specialty clinic if you don't feel better in the next 2 days.    4. Know when to call 911. Emergency warning signs include:  ? Trouble breathing or shortness of breath  ? Chest pain or pressure that doesn't go away  ? Feeling confused like you haven't felt before, or not being able to wake up  ? Bluish-colored lips or face    5. If your doctor prescribed a blood thinner medicine: Follow their instructions.  Where can I get more information?    [facility] - About COVID-19:   [email or web address]    CDC - If You're Sick: cdc.gov/coronavirus/2019-ncov/about/steps-when-sick.html    CDC - Ending Home Isolation: www.cdc.gov/coronavirus/2019-ncov/hcp/disposition-in-home-patients.html    CDC - Caring for Someone: www.cdc.gov/coronavirus/2019-ncov/if-you-are-sick/care-for-someone.html    [facility] - Interim Guidance for Hospital Discharge to Home: www.health.[facility].[place]/diseases/coronavirus/hcp/hospdischarge.pdf    [facility] clinical trials (COVID-19 research studies): clinicalaffairs.[facility]-clinical-trials    Below are the COVID-19 hotlines at the [facility] Health ([facility]). Interpreters are available.  ? For health questions: Call 492-398-7503 or 1-340.878.2797 (7 a.m. to 7 p.m.)  ? For questions about schools and childcare: Call 483-116-9781 or 1-742.818.4255 (7 a.m. to 7 p.m.)    For informational purposes only. Not to replace the advice of your health care provider. Clinically reviewed by Infection Prevention and the [facility] COVID-19 Clinical Team. Copyright   2020 [facility]. All rights  reserved. [employer] 345865 - Rev 8/4/20.      Thank you for taking steps to prevent the spread of this virus.  o Limit your contact with others.  o Wear a simple mask to cover your cough.  o Wash your hands well and often.  o If you need medical care, go to [facility]org or contact your health care provider.     For more about COVID-19 and caring for yourself at home, visit the CDC website at https://www.cdc.gov/coronavirus/2019-ncov/about/steps-when-sick.html.     To learn about care at [facility], please go to [email or web address].     [facility] clinical trials (COVID-19 research studies): clinicalaffairs.[facility]-clinical-trials.    Below are the COVID-19 hotlines at the [facility] ([facility]). Interpreters are available.     For health questions: Call 752-431-5307 or 1-201.201.9024 (7 a.m. to 7 p.m.)    For questions about schools and childcare: Call 581-216-8624 or 1-496.865.3506 (7 a.m. to 7 p.m.)

## 2020-10-05 NOTE — PROGRESS NOTES
"Clinic Care Coordination Contact  [facility]/Voicemail    Referral Source: ED Follow-Up  Patient presented to ED on 10/4/20 for evaluation of chest pain, SOB, fever. COVID test is pending therefore patient under Rule Out COVID protocol and prompting care coordination outreach.    Clinical Data: Care Coordinator Outreach    Outreach attempted x 1.  Unable to leave message as there was an automated recording stating \"voicemail not available at this time. Please try again later.\"    Plan:  Care Coordinator will try to reach patient again in 1-2 business days.    RODGER CastellanoN, RN   [facility]  - Clinic Care Coordinator  Phone: [phone]       "

## 2020-10-05 NOTE — RESULT ENCOUNTER NOTE
Final Enteric Bacteria and Virus Panel by [facility] Stool is NEGATIVE for Campylobacter group, Salmonella species, Shigella species, Shiga toxin 1 gene, Shiga toxin 2 gene, Vibrio group,  Yersinia enterocolitica,  Rotavirus A,  and Norovirus I and II.    No treatment or change in treatment per [facility] Lab Result protocol.

## 2020-10-06 NOTE — PROGRESS NOTES
Clinic Care Coordination Contact  [facility]/Voicemail    Referral Source: ED Follow-Up  Clinical Data: Care Coordinator Outreach  Outreach attempted x 2.  Left message on patient's voicemail with call back information and requested return call.    Per additional chart review 10/6, COVID-19 test has resulted and is negative.    Plan: Care Coordinator will send care coordination introduction letter with care coordinator contact information and explanation of care coordination services via [employer]. Care Coordinator will do no further outreaches at this time.    RD Castellano, RN   [facility]  - Clinic Care Coordinator  Phone: [phone]

## 2020-11-22 ENCOUNTER — HEALTH MAINTENANCE LETTER (OUTPATIENT)
Age: 30
End: 2020-11-22

## 2021-06-02 ENCOUNTER — RECORDS - HEALTHEAST (OUTPATIENT)
Dept: ADMINISTRATIVE | Facility: CLINIC | Age: 31
End: 2021-06-02

## 2021-09-19 ENCOUNTER — HEALTH MAINTENANCE LETTER (OUTPATIENT)
Age: 31
End: 2021-09-19

## 2022-01-08 ENCOUNTER — HEALTH MAINTENANCE LETTER (OUTPATIENT)
Age: 32
End: 2022-01-08

## 2022-11-20 ENCOUNTER — HEALTH MAINTENANCE LETTER (OUTPATIENT)
Age: 32
End: 2022-11-20

## 2023-04-15 ENCOUNTER — HEALTH MAINTENANCE LETTER (OUTPATIENT)
Age: 33
End: 2023-04-15

## 2025-02-24 DIAGNOSIS — Z31.41 FERTILITY TESTING: Primary | ICD-10-CM
